# Patient Record
Sex: FEMALE | Race: OTHER | URBAN - METROPOLITAN AREA
[De-identification: names, ages, dates, MRNs, and addresses within clinical notes are randomized per-mention and may not be internally consistent; named-entity substitution may affect disease eponyms.]

---
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
## 2021-03-13 NOTE — TELEPHONE ENCOUNTER
Per mom of pt, school of pt need to know if pt had her meningitis vaccine completed if so need the copy, if not need appt when pt can come, Pls advise.
Spoke with mother, pt has had both MCV vaccines. Per mother she will stop by office to p/u copy. No further request needed.
Significant decrease of oral intake greater than 3 days prior to admission

## 2022-12-15 ENCOUNTER — TELEPHONE (OUTPATIENT)
Dept: FAMILY MEDICINE CLINIC | Facility: CLINIC | Age: 22
End: 2022-12-15

## 2022-12-15 NOTE — TELEPHONE ENCOUNTER
Left message, no results seen in system, can check birth records at hospital where she was born, or if she donates blood they will tell her.

## (undated) NOTE — MR AVS SNAPSHOT
[address], [address]  [address] [facility]  [phone]               Thank you for choosing us for your health care visit with Mary Ferguson MD.  We are glad to serve you and happy to provide you with this summ Height Weight BMI    5' 3\" (1.6 m) (34 %*, Z = -0.40) 95 lb (43.092 kg) (5 %*, Z = -1.66) 16.83 kg/m2 (5 %*, Z = -1.65)    Breastfeeding?           No      *Growth percentiles are based on [facility] 2-20 Years data     BP percentiles are based on [facility]

## (undated) NOTE — Clinical Note
VACCINE ADMINISTRATION RECORD  PARENT / GUARDIAN APPROVAL  Date: 2017  Vaccine administered to: Francisco Leungsins     : 3/19/2000    MRN: [id number]    A copy of the appropriate Centers for Disease Control and Prevention Vaccine Information statement

## (undated) NOTE — MR AVS SNAPSHOT
[address]  [address] [facility]  [phone]               Thank you for choosing us for your health care visit with Mauro Severino MD.  We are glad to serve you and happy to provide you with this summ These medications were sent to [address] [place] [address]. [phone], [phone]  [address] [facility]     Phone:  [phone]    - erythromycin base 500 MG Tabs      You can get these medications from any pharmacy o Create a home where healthy choices are available and encouraged  o Make it fun – find ways to engage your children such as:  o playing a game of tag  o cooking healthy meals together  o creating a rainbow shopping list to find colorful fruits and vegeta